# Patient Record
Sex: MALE | Race: WHITE | NOT HISPANIC OR LATINO
[De-identification: names, ages, dates, MRNs, and addresses within clinical notes are randomized per-mention and may not be internally consistent; named-entity substitution may affect disease eponyms.]

---

## 2018-01-09 ENCOUNTER — RECORDS - HEALTHEAST (OUTPATIENT)
Dept: ADMINISTRATIVE | Facility: OTHER | Age: 63
End: 2018-01-09

## 2018-10-12 ENCOUNTER — RECORDS - HEALTHEAST (OUTPATIENT)
Dept: ADMINISTRATIVE | Facility: OTHER | Age: 63
End: 2018-10-12

## 2018-10-18 ENCOUNTER — AMBULATORY - HEALTHEAST (OUTPATIENT)
Dept: ENDOCRINOLOGY | Facility: CLINIC | Age: 63
End: 2018-10-18

## 2018-10-18 DIAGNOSIS — E11.9 DIABETES MELLITUS, TYPE 2 (H): ICD-10-CM

## 2018-10-18 DIAGNOSIS — Z00.00 PERIODIC HEALTH ASSESSMENT, GENERAL SCREENING, ADULT: ICD-10-CM

## 2018-12-03 ENCOUNTER — COMMUNICATION - HEALTHEAST (OUTPATIENT)
Dept: ADMINISTRATIVE | Facility: CLINIC | Age: 63
End: 2018-12-03

## 2019-02-13 ENCOUNTER — COMMUNICATION - HEALTHEAST (OUTPATIENT)
Dept: TELEHEALTH | Facility: CLINIC | Age: 64
End: 2019-02-13

## 2019-02-13 ENCOUNTER — COMMUNICATION - HEALTHEAST (OUTPATIENT)
Dept: LAB | Facility: CLINIC | Age: 64
End: 2019-02-13

## 2019-02-13 ENCOUNTER — AMBULATORY - HEALTHEAST (OUTPATIENT)
Dept: LAB | Facility: CLINIC | Age: 64
End: 2019-02-13

## 2019-02-13 DIAGNOSIS — E11.9 DIABETES MELLITUS, TYPE 2 (H): ICD-10-CM

## 2019-02-13 DIAGNOSIS — Z12.5 SPECIAL SCREENING FOR MALIGNANT NEOPLASM OF PROSTATE: ICD-10-CM

## 2019-02-13 LAB
ANION GAP SERPL CALCULATED.3IONS-SCNC: 9 MMOL/L (ref 5–18)
BUN SERPL-MCNC: 15 MG/DL (ref 8–22)
CALCIUM SERPL-MCNC: 9.9 MG/DL (ref 8.5–10.5)
CHLORIDE BLD-SCNC: 102 MMOL/L (ref 98–107)
CO2 SERPL-SCNC: 26 MMOL/L (ref 22–31)
CREAT SERPL-MCNC: 0.95 MG/DL (ref 0.7–1.3)
CREAT UR-MCNC: 223 MG/DL
GFR SERPL CREATININE-BSD FRML MDRD: >60 ML/MIN/1.73M2
GLUCOSE BLD-MCNC: 207 MG/DL (ref 70–125)
HBA1C MFR BLD: 9.9 % (ref 3.5–6.1)
LDLC SERPL CALC-MCNC: 75 MG/DL
MICROALBUMIN UR-MCNC: 5.42 MG/DL (ref 0–1.99)
MICROALBUMIN/CREAT UR: 24.3 MG/G
POTASSIUM BLD-SCNC: 4.2 MMOL/L (ref 3.5–5)
SODIUM SERPL-SCNC: 137 MMOL/L (ref 136–145)

## 2019-02-15 ENCOUNTER — OFFICE VISIT - HEALTHEAST (OUTPATIENT)
Dept: ENDOCRINOLOGY | Facility: CLINIC | Age: 64
End: 2019-02-15

## 2019-02-15 ENCOUNTER — AMBULATORY - HEALTHEAST (OUTPATIENT)
Dept: ENDOCRINOLOGY | Facility: CLINIC | Age: 64
End: 2019-02-15

## 2019-02-15 ENCOUNTER — COMMUNICATION - HEALTHEAST (OUTPATIENT)
Dept: ENDOCRINOLOGY | Facility: CLINIC | Age: 64
End: 2019-02-15

## 2019-02-15 DIAGNOSIS — Z00.00 PERIODIC HEALTH ASSESSMENT, GENERAL SCREENING, ADULT: ICD-10-CM

## 2019-02-15 DIAGNOSIS — E11.59 TYPE 2 DIABETES MELLITUS WITH OTHER CIRCULATORY COMPLICATION, WITHOUT LONG-TERM CURRENT USE OF INSULIN (H): ICD-10-CM

## 2019-02-15 LAB — PSA SERPL-MCNC: 0.8 NG/ML (ref 0–4.5)

## 2019-02-15 RX ORDER — ALPRAZOLAM 1 MG
1 TABLET ORAL
Status: SHIPPED | COMMUNITY
Start: 2019-02-15

## 2019-02-15 RX ORDER — ZOLPIDEM TARTRATE 10 MG/1
10 TABLET ORAL
Status: SHIPPED | COMMUNITY
Start: 2019-02-15

## 2019-02-15 RX ORDER — ASPIRIN 325 MG
325 TABLET, DELAYED RELEASE (ENTERIC COATED) ORAL DAILY
Status: SHIPPED | COMMUNITY
Start: 2019-02-15

## 2019-02-15 RX ORDER — CHLORAL HYDRATE 500 MG
2 CAPSULE ORAL DAILY
Status: SHIPPED | COMMUNITY
Start: 2019-02-15

## 2019-02-15 RX ORDER — VITAMIN E 268 MG
400 CAPSULE ORAL DAILY
Status: SHIPPED | COMMUNITY
Start: 2019-02-15

## 2019-02-22 ENCOUNTER — RECORDS - HEALTHEAST (OUTPATIENT)
Dept: ADMINISTRATIVE | Facility: OTHER | Age: 64
End: 2019-02-22

## 2019-02-26 ENCOUNTER — RECORDS - HEALTHEAST (OUTPATIENT)
Dept: ADMINISTRATIVE | Facility: OTHER | Age: 64
End: 2019-02-26

## 2019-03-20 ENCOUNTER — COMMUNICATION - HEALTHEAST (OUTPATIENT)
Dept: ENDOCRINOLOGY | Facility: CLINIC | Age: 64
End: 2019-03-20

## 2019-03-20 ENCOUNTER — RECORDS - HEALTHEAST (OUTPATIENT)
Dept: ADMINISTRATIVE | Facility: OTHER | Age: 64
End: 2019-03-20

## 2019-03-20 DIAGNOSIS — E11.59 TYPE 2 DIABETES MELLITUS WITH OTHER CIRCULATORY COMPLICATION, WITHOUT LONG-TERM CURRENT USE OF INSULIN (H): ICD-10-CM

## 2019-05-17 ENCOUNTER — AMBULATORY - HEALTHEAST (OUTPATIENT)
Dept: LAB | Facility: CLINIC | Age: 64
End: 2019-05-17

## 2019-05-17 DIAGNOSIS — E11.59 TYPE 2 DIABETES MELLITUS WITH OTHER CIRCULATORY COMPLICATION, WITHOUT LONG-TERM CURRENT USE OF INSULIN (H): ICD-10-CM

## 2019-05-17 LAB — HBA1C MFR BLD: 8.7 % (ref 3.5–6)

## 2019-05-24 ENCOUNTER — OFFICE VISIT - HEALTHEAST (OUTPATIENT)
Dept: ENDOCRINOLOGY | Facility: CLINIC | Age: 64
End: 2019-05-24

## 2019-05-24 ENCOUNTER — AMBULATORY - HEALTHEAST (OUTPATIENT)
Dept: ENDOCRINOLOGY | Facility: CLINIC | Age: 64
End: 2019-05-24

## 2019-05-24 DIAGNOSIS — E11.59 TYPE 2 DIABETES MELLITUS WITH OTHER CIRCULATORY COMPLICATION, WITHOUT LONG-TERM CURRENT USE OF INSULIN (H): ICD-10-CM

## 2019-05-27 ENCOUNTER — RECORDS - HEALTHEAST (OUTPATIENT)
Dept: ADMINISTRATIVE | Facility: OTHER | Age: 64
End: 2019-05-27

## 2019-07-15 ENCOUNTER — COMMUNICATION - HEALTHEAST (OUTPATIENT)
Dept: ADMINISTRATIVE | Facility: CLINIC | Age: 64
End: 2019-07-15

## 2019-07-15 ENCOUNTER — RECORDS - HEALTHEAST (OUTPATIENT)
Dept: ADMINISTRATIVE | Facility: OTHER | Age: 64
End: 2019-07-15

## 2019-07-15 DIAGNOSIS — E11.59 TYPE 2 DIABETES MELLITUS WITH OTHER CIRCULATORY COMPLICATION, WITHOUT LONG-TERM CURRENT USE OF INSULIN (H): ICD-10-CM

## 2019-07-16 ENCOUNTER — COMMUNICATION - HEALTHEAST (OUTPATIENT)
Dept: ENDOCRINOLOGY | Facility: CLINIC | Age: 64
End: 2019-07-16

## 2019-07-16 DIAGNOSIS — E11.59 TYPE 2 DIABETES MELLITUS WITH OTHER CIRCULATORY COMPLICATION, WITHOUT LONG-TERM CURRENT USE OF INSULIN (H): ICD-10-CM

## 2019-08-07 ENCOUNTER — COMMUNICATION - HEALTHEAST (OUTPATIENT)
Dept: ENDOCRINOLOGY | Facility: CLINIC | Age: 64
End: 2019-08-07

## 2019-08-07 DIAGNOSIS — E11.59 TYPE 2 DIABETES MELLITUS WITH OTHER CIRCULATORY COMPLICATION, WITHOUT LONG-TERM CURRENT USE OF INSULIN (H): ICD-10-CM

## 2019-08-30 ENCOUNTER — RECORDS - HEALTHEAST (OUTPATIENT)
Dept: SCHEDULING | Facility: CLINIC | Age: 64
End: 2019-08-30

## 2019-09-13 ENCOUNTER — AMBULATORY - HEALTHEAST (OUTPATIENT)
Dept: LAB | Facility: CLINIC | Age: 64
End: 2019-09-13

## 2019-09-13 DIAGNOSIS — E11.59 TYPE 2 DIABETES MELLITUS WITH OTHER CIRCULATORY COMPLICATION, WITHOUT LONG-TERM CURRENT USE OF INSULIN (H): ICD-10-CM

## 2019-09-13 LAB
ANION GAP SERPL CALCULATED.3IONS-SCNC: 9 MMOL/L (ref 5–18)
BUN SERPL-MCNC: 18 MG/DL (ref 8–22)
CALCIUM SERPL-MCNC: 9.8 MG/DL (ref 8.5–10.5)
CHLORIDE BLD-SCNC: 104 MMOL/L (ref 98–107)
CO2 SERPL-SCNC: 26 MMOL/L (ref 22–31)
CREAT SERPL-MCNC: 0.98 MG/DL (ref 0.7–1.3)
GFR SERPL CREATININE-BSD FRML MDRD: >60 ML/MIN/1.73M2
GLUCOSE BLD-MCNC: 158 MG/DL (ref 70–125)
HBA1C MFR BLD: 7.1 % (ref 3.5–6)
POTASSIUM BLD-SCNC: 4.7 MMOL/L (ref 3.5–5)
SODIUM SERPL-SCNC: 139 MMOL/L (ref 136–145)

## 2019-09-20 ENCOUNTER — OFFICE VISIT - HEALTHEAST (OUTPATIENT)
Dept: ENDOCRINOLOGY | Facility: CLINIC | Age: 64
End: 2019-09-20

## 2019-09-20 ENCOUNTER — AMBULATORY - HEALTHEAST (OUTPATIENT)
Dept: ENDOCRINOLOGY | Facility: CLINIC | Age: 64
End: 2019-09-20

## 2019-09-20 DIAGNOSIS — E66.01 MORBID OBESITY (H): ICD-10-CM

## 2019-09-20 DIAGNOSIS — E11.59 TYPE 2 DIABETES MELLITUS WITH OTHER CIRCULATORY COMPLICATION, WITHOUT LONG-TERM CURRENT USE OF INSULIN (H): ICD-10-CM

## 2019-09-20 RX ORDER — SIMVASTATIN 20 MG
20 TABLET ORAL AT BEDTIME
Qty: 90 TABLET | Refills: 3 | Status: SHIPPED | OUTPATIENT
Start: 2019-09-20

## 2019-09-20 RX ORDER — HYDROCHLOROTHIAZIDE 12.5 MG/1
12.5 TABLET ORAL DAILY
Qty: 90 TABLET | Refills: 3 | Status: SHIPPED | OUTPATIENT
Start: 2019-09-20

## 2019-09-20 RX ORDER — LISINOPRIL 20 MG/1
TABLET ORAL
Qty: 90 TABLET | Refills: 3 | Status: SHIPPED | OUTPATIENT
Start: 2019-09-20

## 2019-09-20 RX ORDER — ZINC GLUCONATE 50 MG
50 TABLET ORAL DAILY
Status: SHIPPED | COMMUNITY
Start: 2019-09-20

## 2019-09-20 RX ORDER — GLIPIZIDE 10 MG/1
10 TABLET ORAL
Qty: 360 TABLET | Refills: 3 | Status: SHIPPED | OUTPATIENT
Start: 2019-09-20

## 2019-09-20 RX ORDER — GLUCOSAMINE HCL/CHONDROITIN SU 500-400 MG
1 CAPSULE ORAL 4 TIMES DAILY
Qty: 400 STRIP | Refills: 5 | Status: SHIPPED | OUTPATIENT
Start: 2019-09-20

## 2019-09-24 ENCOUNTER — COMMUNICATION - HEALTHEAST (OUTPATIENT)
Dept: ENDOCRINOLOGY | Facility: CLINIC | Age: 64
End: 2019-09-24

## 2019-09-24 DIAGNOSIS — E11.59 TYPE 2 DIABETES MELLITUS WITH OTHER CIRCULATORY COMPLICATION, WITHOUT LONG-TERM CURRENT USE OF INSULIN (H): ICD-10-CM

## 2019-10-31 ENCOUNTER — COMMUNICATION - HEALTHEAST (OUTPATIENT)
Dept: ENDOCRINOLOGY | Facility: CLINIC | Age: 64
End: 2019-10-31

## 2020-03-18 ENCOUNTER — COMMUNICATION - HEALTHEAST (OUTPATIENT)
Dept: ENDOCRINOLOGY | Facility: CLINIC | Age: 65
End: 2020-03-18

## 2020-05-12 ENCOUNTER — COMMUNICATION - HEALTHEAST (OUTPATIENT)
Dept: ENDOCRINOLOGY | Facility: CLINIC | Age: 65
End: 2020-05-12

## 2021-05-29 NOTE — PROGRESS NOTES
[facility]  ENDOCRINOLOGY    Diabetes Note 5/27/2019    Peyman Jenkins, 1955, [id number]          Reason for visit      1. Type 2 diabetes mellitus with other circulatory complication, without long-term current use of insulin (H)        HPI     Peyman Jenkins is a very pleasant 64 y.o. old male who presents for follow up.  SUMMARY:  Peyman returns today in f/u for DM 2. He has dropped his A1c a serious 1.2 points from 9.9 to 8.7.  He has also lost about 10 lbs. He reports getting more exercise, and he is working on portion sizes. He is feeling good.  His glucometer download shows an ave BG of 195. He had an ave of 195 and tested 2 times a day.  He continues on Glipizide and Metformin.  He was hopeful that with better dietary control and movement, he would be successful, and this did work.       Blood glucose data:  Ave: 195, SD: 39    Past Medical History     Patient Active Problem List   Diagnosis     Diabetes mellitus, type 2 (H)        Family History       family history is not on file.    Social History            Review of Systems     Patient has no polyuria or polydipsia, no chest pain, dyspnea or TIA's, no numbness, tingling or pain in extremities  Remainder negative except as noted in HPI.    Vital Signs     /78 (Patient Site: Right Arm, Patient Position: Sitting, Cuff Size: Adult Large)   Pulse 70   Wt (!) 305 lb 1.6 oz (138.4 kg)   Wt Readings from Last 3 Encounters:   05/24/19 (!) 305 lb 1.6 oz (138.4 kg)   02/15/19 (!) 313 lb 4.8 oz (142.1 kg)       Physical Exam     Constitutional:  Well developed, Well nourished  HENT:  Normocephalic,   Neck: Thyroid normal, No lymph nodes, Supple  Eyes:  PERRL, Conjunctiva pink  Respiratory:  Normal breath sounds, No respiratory distress  Cardiovascular:  Normal heart rate, Normal rhythm, No murmurs  GI:  Bowel sounds normal, Soft, No tenderness  Musculoskeletal:  No gross deformity or lesions, normal dorsalis pedis pulses  Skin: No acanthosis nigricans,  lipoatrophy or lipodystrophy  Neurologic:  Alert & oriented x 3, nonfocal  Psychiatric:  Affect, Mood, Insight appropriate      Assessment     1. Type 2 diabetes mellitus with other circulatory complication, without long-term current use of insulin (H)        Plan     We have decided on another 3 months of hard work before we decide to add any more medication. He would like to build on his current momentum.  Time spent with pt today: 25 min with >50% spent in counseling and coordination of care.          Maribel SUÁREZ Endocrinology  5/27/2019  6:53 AM        Lab Results     Hemoglobin A1c   Date Value Ref Range Status   05/17/2019 8.7 (H) 3.5 - 6.0 % Final   02/13/2019 9.9 (H) 3.5 - 6.1 % Final     Creatinine   Date Value Ref Range Status   02/13/2019 0.95 0.70 - 1.30 mg/dL Final     Microalbumin, Random Urine   Date Value Ref Range Status   02/13/2019 5.42 (H) 0.00 - 1.99 mg/dL Final       No results found for: CHOL, HDL, LDLCALC, TRIG    No results found for: ALT, AST, GGT, ALKPHOS, BILITOT      Current Medications     Outpatient Medications Prior to Visit   Medication Sig Dispense Refill     ALPRAZolam (XANAX) 1 MG tablet Take 1 mg by mouth. Take 1 tab daily as needed       aspirin 325 MG EC tablet Take 325 mg by mouth daily.       blood glucose test (GLUCOSE BLOOD) strips Use with glucometer once daily (Patient taking differently: 4 (four) times a day. Use with glucometer once daily      ) 100 strip 2     cyanocobalamin (VITAMIN B-12) 500 MCG tablet Take 500 mcg by mouth daily.       generic lancets (FINGERSTIX LANCETS) Dispense brand per patient's insurance at pharmacy discretion. (Patient taking differently: 4 (four) times a day. Dispense brand per patient's insurance at pharmacy discretion.      ) 200 each 0     glipiZIDE (GLUCOTROL) 10 MG tablet Take 10 mg by mouth 2 (two) times a day before meals. Take 2 tabs in am; 2 tabs in pm       hydroCHLOROthiazide (HYDRODIURIL) 12.5 MG tablet Take 12.5 mg by  mouth daily.       lisinopril (PRINIVIL,ZESTRIL) 20 MG tablet Take 20 mg by mouth daily. Take 1 tab in am; 1/2 tab in pm       metFORMIN (GLUMETZA) 1000 MG (MOD) 24 hr tablet Take 1,000 mg by mouth 2 (two) times a day with meals.       omega-3/dha/epa/fish oil (FISH OIL-OMEGA-3 FATTY ACIDS) 300-1,000 mg capsule Take 2 g by mouth daily.       simvastatin (ZOCOR) 20 MG tablet Take 20 mg by mouth at bedtime.       vitamin E 400 unit capsule Take 400 Units by mouth daily.       zolpidem (AMBIEN) 10 mg tablet Take 10 mg by mouth at bedtime as needed for sleep.       blood glucose meter (GLUCOMETER) Use 1 each As Directed as needed. Dispense glucometer brand per patient's insurance at pharmacy discretion. 1 each 0     blood-glucose meter (ONETOUCH VERIO IQ METER) Misc Use glucometer 4-6 times daily 1 each 1     No facility-administered medications prior to visit.

## 2021-05-30 NOTE — TELEPHONE ENCOUNTER
Vero called from [facility] pharmacy stating they didn't received the rx for pt testing 4 times daily.     They have an old rx from a different provider testing once daily.   I give a verbal order to the pharmacist with instruction test 4 times daily.

## 2021-05-30 NOTE — TELEPHONE ENCOUNTER
Patient Returning Call  Reason for call:  Patient calling back  Information relayed to patient:  Below message relayed to patient- patient is STILL out of diabetic supplies, very upset,    Patient has additional questions:  Yes- please call pharmacy ASAP, pt needs supplies today, they have received nothing from MD.   If YES, what are your questions/concerns:    Okay to leave a detailed message?:  Yes

## 2021-05-30 NOTE — TELEPHONE ENCOUNTER
Patient needs a new script for test strips.  Current script states he checks 1 x a day but he has been checking 4 x a day.

## 2021-05-30 NOTE — TELEPHONE ENCOUNTER
Patient calling back about this request. States that he tests 4 to 6 times a day.    [facility] [address] [place] [address], [place] - [facility] AT [facility] [address]    Pt is out of test strips

## 2021-06-01 NOTE — PROGRESS NOTES
[facility]  ENDOCRINOLOGY    Diabetes Note 9/23/2019    Peyman Jenkins, 1955, [id number]          Reason for visit      1. Type 2 diabetes mellitus with other circulatory complication, without long-term current use of insulin (H)    2. Morbid obesity (H)        HPI     Peyman Jenkins is a very pleasant 64 y.o. old male who presents for follow up.  SUMMARY:  Peyman returns today in f/u for DM 2. He has dropped his A1c from 8.7 to 7.1.  He has been hospitalized twice since his last visit for infections with cellulitis in his R foot, of which he had previously had a transmetatarsal amputation done. He is currently under the care of Dr Sahu for this. He brings his glucometer today which shows a 7 day average of 182, 14 of 165, 30 of 161 and 90 of 169. He is taking Glipizide and Metformin and was started on Lantus during one of hospitalizations. He is only taking it if he has a BG of 150 or higher at HS.       Past Medical History     Patient Active Problem List   Diagnosis     Diabetes mellitus, type 2 (H)     Essential hypertension     Hyperlipidemia     Microcytic anemia     Morbid obesity (H)     Diabetic infection of right foot (H)        Family History       family history is not on file.    Social History            Review of Systems     Patient has no polyuria or polydipsia, no chest pain, dyspnea or TIA's, no numbness, tingling or pain in extremities  Remainder negative except as noted in HPI.    Vital Signs     /86 (Patient Site: Right Arm, Patient Position: Sitting, Cuff Size: Adult Large)   Pulse 84   Wt (!) 297 lb 12.8 oz (135.1 kg)   Wt Readings from Last 3 Encounters:   09/20/19 (!) 297 lb 12.8 oz (135.1 kg)   05/24/19 (!) 305 lb 1.6 oz (138.4 kg)   02/15/19 (!) 313 lb 4.8 oz (142.1 kg)       Physical Exam     Constitutional:  Well developed, Well nourished  HENT:  Normocephalic,   Neck: Thyroid normal, No lymph nodes, Supple  Eyes:  PERRL, Conjunctiva pink  Respiratory:  Normal breath sounds,  No respiratory distress  Cardiovascular:  Normal heart rate, Normal rhythm, No murmurs  GI:  Bowel sounds normal, Soft, No tenderness  Musculoskeletal:  No gross deformity or lesions, normal dorsalis pedis pulses  Skin: No acanthosis nigricans, lipoatrophy or lipodystrophy  Neurologic:  Alert & oriented x 3, nonfocal  Psychiatric:  Affect, Mood, Insight appropriate        Assessment     1. Type 2 diabetes mellitus with other circulatory complication, without long-term current use of insulin (H)    2. Morbid obesity (H)        Sherrill Morley's control has improved. He will continue with his current regimen of medication. Refills provided today. He will f/u with me in 6 months. Time spent with pt today: 25 min with >50% spent in counseling and coordination of care.      Maribel SUÁREZ Endocrinology  9/23/2019  1:23 PM      Lab Results     Hemoglobin A1c   Date Value Ref Range Status   09/13/2019 7.1 (H) 3.5 - 6.0 % Final   05/17/2019 8.7 (H) 3.5 - 6.0 % Final   02/13/2019 9.9 (H) 3.5 - 6.1 % Final     Creatinine   Date Value Ref Range Status   09/13/2019 0.98 0.70 - 1.30 mg/dL Final   02/13/2019 0.95 0.70 - 1.30 mg/dL Final     Microalbumin, Random Urine   Date Value Ref Range Status   02/13/2019 5.42 (H) 0.00 - 1.99 mg/dL Final       No results found for: CHOL, HDL, LDLCALC, TRIG    No results found for: ALT, AST, GGT, ALKPHOS, BILITOT      Current Medications     Outpatient Medications Prior to Visit   Medication Sig Dispense Refill     zinc gluconate 50 mg tablet Take 50 mg by mouth daily.       ALPRAZolam (XANAX) 1 MG tablet Take 1 mg by mouth. Take 1 tab daily as needed       aspirin 325 MG EC tablet Take 325 mg by mouth daily.       cyanocobalamin (VITAMIN B-12) 500 MCG tablet Take 500 mcg by mouth daily.       generic lancets (FINGERSTIX LANCETS) Dispense brand per patient's insurance at pharmacy discretion. (Patient taking differently: 4 (four) times a day. Dispense brand per patient's insurance at  pharmacy discretion.      ) 200 each 0     omega-3/dha/epa/fish oil (FISH OIL-OMEGA-3 FATTY ACIDS) 300-1,000 mg capsule Take 2 g by mouth daily.       vitamin E 400 unit capsule Take 400 Units by mouth daily.       zolpidem (AMBIEN) 10 mg tablet Take 10 mg by mouth at bedtime as needed for sleep.       blood glucose test (GLUCOSE BLOOD) strips Use 1 each As Directed 4 (four) times a day. 400 strip 0     glipiZIDE (GLUCOTROL) 10 MG tablet Take 10 mg by mouth 2 (two) times a day before meals. Take 2 tabs in am; 2 tabs in pm       hydroCHLOROthiazide (HYDRODIURIL) 12.5 MG tablet Take 1 tablet (12.5 mg total) by mouth daily. 90 tablet 0     lisinopril (PRINIVIL,ZESTRIL) 20 MG tablet Take 20 mg by mouth daily. Take 1 tab in am             metFORMIN (GLUMETZA) 1000 MG (MOD) 24 hr tablet Take 1,000 mg by mouth 2 (two) times a day with meals.       simvastatin (ZOCOR) 20 MG tablet Take 20 mg by mouth at bedtime.       No facility-administered medications prior to visit.

## 2021-06-02 VITALS — WEIGHT: 313.3 LBS

## 2021-06-03 VITALS — DIASTOLIC BLOOD PRESSURE: 86 MMHG | SYSTOLIC BLOOD PRESSURE: 134 MMHG | WEIGHT: 297.8 LBS | HEART RATE: 84 BPM

## 2021-06-03 VITALS — WEIGHT: 305.1 LBS

## 2021-06-06 NOTE — TELEPHONE ENCOUNTER
metFORMIN (GLUCOPHAGE) 1000 MG tablet  180 tablet  0  10/30/2019   --    Sig - Route: Take 1 tablet (1,000 mg total) by mouth 2 (two) times a day with meals. - Oral      Please initiate PA for medication above.

## 2021-06-08 NOTE — TELEPHONE ENCOUNTER
05.12 - called x 1 - pt states that he got a family dr in [place] that is filling his RXs. So we can disregard this request.

## 2021-06-16 PROBLEM — E66.01 MORBID OBESITY (H): Status: ACTIVE | Noted: 2019-05-27

## 2021-06-16 PROBLEM — E11.628 DIABETIC INFECTION OF RIGHT FOOT (H): Status: ACTIVE | Noted: 2019-07-06

## 2021-06-16 PROBLEM — D50.9 MICROCYTIC ANEMIA: Status: ACTIVE | Noted: 2019-05-27

## 2021-06-16 PROBLEM — E11.9 DIABETES MELLITUS, TYPE 2 (H): Status: ACTIVE | Noted: 2018-10-18

## 2021-06-16 PROBLEM — L08.9 DIABETIC INFECTION OF RIGHT FOOT (H): Status: ACTIVE | Noted: 2019-07-06

## 2021-06-16 PROBLEM — I10 ESSENTIAL HYPERTENSION: Status: ACTIVE | Noted: 2019-05-27

## 2021-06-16 PROBLEM — E78.5 HYPERLIPIDEMIA: Status: ACTIVE | Noted: 2019-05-27

## 2021-06-16 NOTE — TELEPHONE ENCOUNTER
Telephone Encounter by Gricelda Anders at 10/31/2019 10:51 AM     Author: Gricelda Anders Service: -- Author Type: Financial Resource Guide    Filed: 10/31/2019 10:52 AM Encounter Date: 10/31/2019 Status: Signed    : Gricelda Anders (Financial Resource Guide)       Medication: Metformin 1000mg  QTY: 60 tabs for 30 days  Insurance: [employer] Part D  Additional Information: dose increase

## 2021-06-16 NOTE — TELEPHONE ENCOUNTER
Telephone Encounter by Do Helms at 3/18/2020 11:42 AM     Author: Do Helms Service: -- Author Type: --    Filed: 3/18/2020 11:43 AM Encounter Date: 3/18/2020 Status: Signed    : Do Helms       PA is not needed.  Medication is covered without PA.

## 2021-06-16 NOTE — TELEPHONE ENCOUNTER
Telephone Encounter by Gricelda Anders at 10/31/2019  3:14 PM     Author: Gricelda Anders Service: -- Author Type: Financial Resource Guide    Filed: 10/31/2019  3:19 PM Encounter Date: 10/31/2019 Status: Signed    : Gricelda Anders (Financial Resource Guide)         No PA required

## 2021-06-23 NOTE — TELEPHONE ENCOUNTER
LMTCB to inquire what lab patient is requesting.  Unsure what a TSA is.  Did he mean to say PSA, in which case we cannot check that lab as it is not related to his DM.  PCP would have to order that.

## 2021-06-24 NOTE — TELEPHONE ENCOUNTER
I called LM for the pt to c/b. Will close this encounter as the pt has not called back and has an appt today.

## 2021-06-24 NOTE — TELEPHONE ENCOUNTER
----- Message from Maribel Lepe NP sent at 2/15/2019  2:04 PM CST -----  Please call pt and let him know that his PSA is normal

## 2021-06-24 NOTE — TELEPHONE ENCOUNTER
I want a million dollars, but am not getting it from my Endocrinologist that I haven't seen yet.  He needs a PCP, and that would be appropriate for he/she to order for him.

## 2021-06-24 NOTE — TELEPHONE ENCOUNTER
No PCP listed in our system. Called patient and left message stating he would need to either contact past PCP to order PSA or establish care with a new PCP.

## 2021-06-24 NOTE — PROGRESS NOTES
[facility]  ENDOCRINOLOGY    Diabetes Note 2/18/2019    Peyman Jenikns, 1955, [id number]          Reason for visit      1. Type 2 diabetes mellitus with other circulatory complication, without long-term current use of insulin (H)    2. Periodic health assessment, general screening, adult        HPI     Peyman Jenkins is a very pleasant 63 y.o. old male who presents for follow up.  SUMMARY:  Peyman is here today to establish care for DM 2.  His current A1c is 9.9.  He reports that he was diagnosed in 2007.  He has had a lap band procedure done and that he is down about 50 lbs.  He has a hx of osteomyelitis in his R foot, and las lost all of his toes.  This happened about 12 years ago.  He states that he has about 2/3 of his foot left. He states that as recently as 2 years ago, he had A1cs in the 7s.  He reports that the last 3 years have been the most challenging for him.  He moved to [place] about a year ago. He was under the care of an Endocrinologist there.  He is working at being semi-retired.  He works 5 days a week at [employer] where he walks a fair amount. He is treating currently with Glipizide and Metformin only.  He does have Medicare, which limits some of the treatment available for him.        Past Medical History     Patient Active Problem List   Diagnosis     Diabetes mellitus, type 2 (H)        Family History       family history is not on file.    Social History            Review of Systems     Patient has no polyuria or polydipsia, no chest pain, dyspnea or TIA's, no numbness, tingling or pain in extremities  Remainder negative except as noted in HPI.    Vital Signs     /82 (Patient Site: Right Arm, Patient Position: Sitting, Cuff Size: Adult Large)   Pulse 78   Wt (!) 313 lb 4.8 oz (142.1 kg)   Wt Readings from Last 3 Encounters:   02/15/19 (!) 313 lb 4.8 oz (142.1 kg)       Physical Exam     Constitutional:  Well developed, Well nourished  HENT:  Normocephalic,   Neck: Thyroid normal, No  lymph nodes, Supple  Eyes:  PERRL, Conjunctiva pink  Respiratory:  Normal breath sounds, No respiratory distress  Cardiovascular:  Normal heart rate, Normal rhythm, No murmurs  GI:  Bowel sounds normal, Soft, No tenderness  Musculoskeletal:  No gross deformity or lesions, normal dorsalis pedis pulses        Assessment     1. Type 2 diabetes mellitus with other circulatory complication, without long-term current use of insulin (H)    2. Periodic health assessment, general screening, adult        Plan     Much discussion about possible treatment plans - discussed SGLT2, GLP-1, and insulin.  He has been successful with better dietary control and increased movement in the past, and would like to see if returning to this lifestyle will be successful for him. We have agreed that he should have the opportunity to attempt this again without the addition of another medication.  It is likely that insulin will be his least expensive addition, and this was also discussed. Pt's brother was dx with Prostate Cancer, and he would like a screening PSA for this reason. This was ordered for him.   I will see him back in 3 months, hopefully with improvement.  Time spent with pt today: 30 min with >50% spent in counseling and coordination of care.        Maribel Lepe   Endocrinology  2/18/2019  8:17 AM      Lab Results     Hemoglobin A1c   Date Value Ref Range Status   02/13/2019 9.9 (H) 3.5 - 6.1 % Final     Creatinine   Date Value Ref Range Status   02/13/2019 0.95 0.70 - 1.30 mg/dL Final     Microalbumin, Random Urine   Date Value Ref Range Status   02/13/2019 5.42 (H) 0.00 - 1.99 mg/dL Final       No results found for: CHOL, HDL, LDLCALC, TRIG    No results found for: ALT, AST, GGT, ALKPHOS, BILITOT      Current Medications     Outpatient Medications Prior to Visit   Medication Sig Dispense Refill     ALPRAZolam (XANAX) 1 MG tablet Take 1 mg by mouth. Take 1 tab daily as needed       aspirin 325 MG EC tablet Take 325 mg by  mouth daily.       cyanocobalamin (VITAMIN B-12) 500 MCG tablet Take 500 mcg by mouth daily.       glipiZIDE (GLUCOTROL) 10 MG tablet Take 10 mg by mouth 2 (two) times a day before meals. Take 2 tabs in am; 2 tabs in pm       hydroCHLOROthiazide (HYDRODIURIL) 12.5 MG tablet Take 12.5 mg by mouth daily.       lisinopril (PRINIVIL,ZESTRIL) 20 MG tablet Take 20 mg by mouth daily. Take 1 tab in am; 1/2 tab in pm       metFORMIN (GLUMETZA) 1000 MG (MOD) 24 hr tablet Take 1,000 mg by mouth 2 (two) times a day with meals.       omega-3/dha/epa/fish oil (FISH OIL-OMEGA-3 FATTY ACIDS) 300-1,000 mg capsule Take 2 g by mouth daily.       simvastatin (ZOCOR) 20 MG tablet Take 20 mg by mouth at bedtime.       vitamin E 400 unit capsule Take 400 Units by mouth daily.       zolpidem (AMBIEN) 10 mg tablet Take 10 mg by mouth at bedtime as needed for sleep.       blood sugar diagnostic (BLOOD GLUCOSE TEST MISC) Use As Directed 2 (two) times a day.       No facility-administered medications prior to visit.

## 2021-06-24 NOTE — TELEPHONE ENCOUNTER
Patient came in for labs for upcoming visit with Maribel Lepe. Patient stated he is overdue for a PSA check and would like it added on if possible. Please review and add lab if appropriate.

## 2021-07-03 NOTE — ADDENDUM NOTE
Addendum Note by Ayla Lepe NP at 10/18/2018  1:38 PM     Author: Ayla Lepe NP Service: -- Author Type: Nurse Practitioner    Filed: 2/15/2019  9:11 AM Encounter Date: 10/18/2018 Status: Signed    : Ayla Lepe NP (Nurse Practitioner)    Addended by: AYLA LEPE on: 2/15/2019 09:11 AM        Modules accepted: Orders

## 2021-07-03 NOTE — ADDENDUM NOTE
Addendum Note by Emma Blunt CMA at 10/30/2019 10:11 AM     Author: Emma Blunt CMA Service: -- Author Type: Certified Medical Assistant    Filed: 10/30/2019 10:11 AM Encounter Date: 9/24/2019 Status: Signed    : Emma Blunt CMA (Certified Medical Assistant)    Addended by: EMMA BLUNT on: 10/30/2019 10:11 AM        Modules accepted: Orders